# Patient Record
Sex: MALE | Race: ASIAN | NOT HISPANIC OR LATINO
[De-identification: names, ages, dates, MRNs, and addresses within clinical notes are randomized per-mention and may not be internally consistent; named-entity substitution may affect disease eponyms.]

---

## 2021-07-09 ENCOUNTER — APPOINTMENT (OUTPATIENT)
Dept: CT IMAGING | Facility: CLINIC | Age: 66
End: 2021-07-09
Payer: MEDICARE

## 2021-07-09 ENCOUNTER — OUTPATIENT (OUTPATIENT)
Dept: OUTPATIENT SERVICES | Facility: HOSPITAL | Age: 66
LOS: 1 days | End: 2021-07-09

## 2021-07-09 PROCEDURE — 75574 CT ANGIO HRT W/3D IMAGE: CPT | Mod: 26

## 2021-07-28 PROBLEM — Z00.00 ENCOUNTER FOR PREVENTIVE HEALTH EXAMINATION: Status: ACTIVE | Noted: 2021-07-28

## 2021-07-30 ENCOUNTER — APPOINTMENT (OUTPATIENT)
Dept: CT IMAGING | Facility: CLINIC | Age: 66
End: 2021-07-30
Payer: MEDICARE

## 2021-07-30 ENCOUNTER — OUTPATIENT (OUTPATIENT)
Dept: OUTPATIENT SERVICES | Facility: HOSPITAL | Age: 66
LOS: 1 days | End: 2021-07-30

## 2021-07-30 PROCEDURE — 71250 CT THORAX DX C-: CPT | Mod: 26

## 2021-08-20 ENCOUNTER — APPOINTMENT (OUTPATIENT)
Dept: THORACIC SURGERY | Facility: CLINIC | Age: 66
End: 2021-08-20
Payer: MEDICARE

## 2021-08-20 ENCOUNTER — OUTPATIENT (OUTPATIENT)
Dept: OUTPATIENT SERVICES | Facility: HOSPITAL | Age: 66
LOS: 1 days | End: 2021-08-20
Payer: COMMERCIAL

## 2021-08-20 VITALS
WEIGHT: 155 LBS | HEART RATE: 74 BPM | HEIGHT: 67 IN | SYSTOLIC BLOOD PRESSURE: 142 MMHG | TEMPERATURE: 98 F | DIASTOLIC BLOOD PRESSURE: 66 MMHG | BODY MASS INDEX: 24.33 KG/M2 | OXYGEN SATURATION: 98 %

## 2021-08-20 DIAGNOSIS — R91.8 OTHER NONSPECIFIC ABNORMAL FINDING OF LUNG FIELD: ICD-10-CM

## 2021-08-20 DIAGNOSIS — Z85.528 PERSONAL HISTORY OF OTHER MALIGNANT NEOPLASM OF KIDNEY: ICD-10-CM

## 2021-08-20 DIAGNOSIS — Z01.818 ENCOUNTER FOR OTHER PREPROCEDURAL EXAMINATION: ICD-10-CM

## 2021-08-20 LAB
ALBUMIN SERPL ELPH-MCNC: 4.4 G/DL — SIGNIFICANT CHANGE UP (ref 3.3–5)
ALP SERPL-CCNC: 107 U/L — SIGNIFICANT CHANGE UP (ref 40–120)
ALT FLD-CCNC: 20 U/L — SIGNIFICANT CHANGE UP (ref 10–45)
ANION GAP SERPL CALC-SCNC: 14 MMOL/L — SIGNIFICANT CHANGE UP (ref 5–17)
APPEARANCE UR: CLEAR — SIGNIFICANT CHANGE UP
APTT BLD: 44 SEC — HIGH (ref 27.5–35.5)
AST SERPL-CCNC: 20 U/L — SIGNIFICANT CHANGE UP (ref 10–40)
BASOPHILS # BLD AUTO: 0.03 K/UL — SIGNIFICANT CHANGE UP (ref 0–0.2)
BASOPHILS NFR BLD AUTO: 0.5 % — SIGNIFICANT CHANGE UP (ref 0–2)
BILIRUB SERPL-MCNC: 0.4 MG/DL — SIGNIFICANT CHANGE UP (ref 0.2–1.2)
BILIRUB UR-MCNC: NEGATIVE — SIGNIFICANT CHANGE UP
BUN SERPL-MCNC: 31 MG/DL — HIGH (ref 7–23)
CALCIUM SERPL-MCNC: 11.1 MG/DL — HIGH (ref 8.4–10.5)
CHLORIDE SERPL-SCNC: 100 MMOL/L — SIGNIFICANT CHANGE UP (ref 96–108)
CHOLEST SERPL-MCNC: 121 MG/DL — SIGNIFICANT CHANGE UP
CO2 SERPL-SCNC: 26 MMOL/L — SIGNIFICANT CHANGE UP (ref 22–31)
COLOR SPEC: YELLOW — SIGNIFICANT CHANGE UP
CREAT SERPL-MCNC: 1.46 MG/DL — HIGH (ref 0.5–1.3)
DIFF PNL FLD: NEGATIVE — SIGNIFICANT CHANGE UP
EOSINOPHIL # BLD AUTO: 0.2 K/UL — SIGNIFICANT CHANGE UP (ref 0–0.5)
EOSINOPHIL NFR BLD AUTO: 3.3 % — SIGNIFICANT CHANGE UP (ref 0–6)
GLUCOSE SERPL-MCNC: 149 MG/DL — HIGH (ref 70–99)
GLUCOSE UR QL: NEGATIVE — SIGNIFICANT CHANGE UP
HCT VFR BLD CALC: 38.9 % — LOW (ref 39–50)
HDLC SERPL-MCNC: 49 MG/DL — SIGNIFICANT CHANGE UP
HGB BLD-MCNC: 12.8 G/DL — LOW (ref 13–17)
IMM GRANULOCYTES NFR BLD AUTO: 0.2 % — SIGNIFICANT CHANGE UP (ref 0–1.5)
INR BLD: 0.87 — LOW (ref 0.88–1.16)
KETONES UR-MCNC: NEGATIVE — SIGNIFICANT CHANGE UP
LEUKOCYTE ESTERASE UR-ACNC: NEGATIVE — SIGNIFICANT CHANGE UP
LIPID PNL WITH DIRECT LDL SERPL: 57 MG/DL — SIGNIFICANT CHANGE UP
LYMPHOCYTES # BLD AUTO: 1.49 K/UL — SIGNIFICANT CHANGE UP (ref 1–3.3)
LYMPHOCYTES # BLD AUTO: 24.4 % — SIGNIFICANT CHANGE UP (ref 13–44)
MCHC RBC-ENTMCNC: 32.2 PG — SIGNIFICANT CHANGE UP (ref 27–34)
MCHC RBC-ENTMCNC: 32.9 GM/DL — SIGNIFICANT CHANGE UP (ref 32–36)
MCV RBC AUTO: 98 FL — SIGNIFICANT CHANGE UP (ref 80–100)
MONOCYTES # BLD AUTO: 0.37 K/UL — SIGNIFICANT CHANGE UP (ref 0–0.9)
MONOCYTES NFR BLD AUTO: 6.1 % — SIGNIFICANT CHANGE UP (ref 2–14)
NEUTROPHILS # BLD AUTO: 4 K/UL — SIGNIFICANT CHANGE UP (ref 1.8–7.4)
NEUTROPHILS NFR BLD AUTO: 65.5 % — SIGNIFICANT CHANGE UP (ref 43–77)
NITRITE UR-MCNC: NEGATIVE — SIGNIFICANT CHANGE UP
NON HDL CHOLESTEROL: 72 MG/DL — SIGNIFICANT CHANGE UP
NRBC # BLD: 0 /100 WBCS — SIGNIFICANT CHANGE UP (ref 0–0)
PH UR: 6.5 — SIGNIFICANT CHANGE UP (ref 5–8)
PLATELET # BLD AUTO: 264 K/UL — SIGNIFICANT CHANGE UP (ref 150–400)
POTASSIUM SERPL-MCNC: 5.4 MMOL/L — HIGH (ref 3.5–5.3)
POTASSIUM SERPL-SCNC: 5.4 MMOL/L — HIGH (ref 3.5–5.3)
PROT SERPL-MCNC: 8.4 G/DL — HIGH (ref 6–8.3)
PROT UR-MCNC: NEGATIVE MG/DL — SIGNIFICANT CHANGE UP
PROTHROM AB SERPL-ACNC: 10.5 SEC — LOW (ref 10.6–13.6)
RBC # BLD: 3.97 M/UL — LOW (ref 4.2–5.8)
RBC # FLD: 12.8 % — SIGNIFICANT CHANGE UP (ref 10.3–14.5)
SODIUM SERPL-SCNC: 140 MMOL/L — SIGNIFICANT CHANGE UP (ref 135–145)
SP GR SPEC: 1.01 — SIGNIFICANT CHANGE UP (ref 1–1.03)
TRIGL SERPL-MCNC: 76 MG/DL — SIGNIFICANT CHANGE UP
UROBILINOGEN FLD QL: 0.2 E.U./DL — SIGNIFICANT CHANGE UP
WBC # BLD: 6.1 K/UL — SIGNIFICANT CHANGE UP (ref 3.8–10.5)
WBC # FLD AUTO: 6.1 K/UL — SIGNIFICANT CHANGE UP (ref 3.8–10.5)

## 2021-08-20 PROCEDURE — 80061 LIPID PANEL: CPT

## 2021-08-20 PROCEDURE — 99205 OFFICE O/P NEW HI 60 MIN: CPT

## 2021-08-20 PROCEDURE — 36415 COLL VENOUS BLD VENIPUNCTURE: CPT

## 2021-08-20 PROCEDURE — 85025 COMPLETE CBC W/AUTO DIFF WBC: CPT

## 2021-08-20 PROCEDURE — 80053 COMPREHEN METABOLIC PANEL: CPT

## 2021-08-20 PROCEDURE — 85730 THROMBOPLASTIN TIME PARTIAL: CPT

## 2021-08-20 PROCEDURE — 81003 URINALYSIS AUTO W/O SCOPE: CPT

## 2021-08-20 PROCEDURE — 85610 PROTHROMBIN TIME: CPT

## 2021-08-20 RX ORDER — SITAGLIPTIN 100 MG/1
100 TABLET, FILM COATED ORAL
Refills: 0 | Status: ACTIVE | COMMUNITY

## 2021-08-20 RX ORDER — ASPIRIN 81 MG
81 TABLET, DELAYED RELEASE (ENTERIC COATED) ORAL
Refills: 0 | Status: ACTIVE | COMMUNITY

## 2021-08-20 RX ORDER — OMEPRAZOLE 40 MG/1
40 CAPSULE, DELAYED RELEASE ORAL
Refills: 0 | Status: ACTIVE | COMMUNITY

## 2021-08-20 RX ORDER — GLIPIZIDE 10 MG/1
10 TABLET ORAL
Refills: 0 | Status: ACTIVE | COMMUNITY

## 2021-08-20 RX ORDER — ATORVASTATIN CALCIUM 40 MG/1
40 TABLET, FILM COATED ORAL
Refills: 0 | Status: ACTIVE | COMMUNITY

## 2021-08-20 RX ORDER — TAMSULOSIN HYDROCHLORIDE 0.4 MG/1
0.4 CAPSULE ORAL
Refills: 0 | Status: ACTIVE | COMMUNITY

## 2021-08-20 RX ORDER — METFORMIN HYDROCHLORIDE 1000 MG/1
1000 TABLET, COATED ORAL
Refills: 0 | Status: ACTIVE | COMMUNITY

## 2021-08-20 RX ORDER — PRASUGREL HYDROCHLORIDE 10 MG/1
10 TABLET, COATED ORAL
Refills: 0 | Status: ACTIVE | COMMUNITY

## 2021-08-20 NOTE — PHYSICAL EXAM
[General Appearance - Alert] : alert [General Appearance - In No Acute Distress] : in no acute distress [General Appearance - Well Nourished] : well nourished [General Appearance - Well Developed] : well developed [Outer Ear] : the ears and nose were normal in appearance [Hearing Threshold Finger Rub Not [place]] : hearing was normal [Apical Impulse] : the apical impulse was normal [Heart Sounds] : normal S1 and S2 [Examination Of The Chest] : the chest was normal in appearance [Abnormal Walk] : normal gait [Musculoskeletal - Swelling] : no joint swelling seen

## 2021-08-23 ENCOUNTER — APPOINTMENT (OUTPATIENT)
Dept: MRI IMAGING | Facility: CLINIC | Age: 66
End: 2021-08-23
Payer: MEDICARE

## 2021-08-23 ENCOUNTER — OUTPATIENT (OUTPATIENT)
Dept: OUTPATIENT SERVICES | Facility: HOSPITAL | Age: 66
LOS: 1 days | End: 2021-08-23

## 2021-08-23 PROCEDURE — 70553 MRI BRAIN STEM W/O & W/DYE: CPT | Mod: 26

## 2021-08-31 ENCOUNTER — APPOINTMENT (OUTPATIENT)
Dept: DISASTER EMERGENCY | Facility: CLINIC | Age: 66
End: 2021-08-31

## 2021-09-01 LAB — SARS-COV-2 N GENE NPH QL NAA+PROBE: NOT DETECTED

## 2021-09-02 ENCOUNTER — TRANSCRIPTION ENCOUNTER (OUTPATIENT)
Age: 66
End: 2021-09-02

## 2021-09-02 VITALS
TEMPERATURE: 98 F | HEART RATE: 70 BPM | SYSTOLIC BLOOD PRESSURE: 134 MMHG | WEIGHT: 154.76 LBS | RESPIRATION RATE: 16 BRPM | OXYGEN SATURATION: 100 % | HEIGHT: 67 IN | DIASTOLIC BLOOD PRESSURE: 78 MMHG

## 2021-09-02 NOTE — HISTORY OF PRESENT ILLNESS
[FreeTextEntry1] : 66 year old male, PMHx of DM, HLD cardiac stents 2012 and 2018 on Effient, renal cell carcinoma s/p left nephrectomy in 2000, presented to cardiology for routine CTA Coronary, found to have multiple lung nodules. He is referred by Dr. Gina Ordaz for surgical evaluation. \par \par CTA Coronary with IV Contrast 7/9/21\par 1. Numerous solid pulmonary nodules/Mass In both lungs, multiple pleural/subpleural nodules, small left pleural effusion. Findings are suspicious for pulmonary metastases and possible malignant left pleural effusion. Further evaluation with dedicated chest CT is recommended.\par 2. Indeterminate right hilar node.\par \par CT Chest 7/30/21\par 1. Numerous pleural/subpleural pulmonary nodules, suspicious for metastases from primary pulmonary neoplasm or other extrapulmonary neoplasm. Correlation with PET/CT, histology is recommended.\par 2. Trace left pulmonary effusion, decreased in extent since prior study.\par \par PET 8/14/21\par - LLL peripheral masslike opacity with hypermetabolic activity. Small left pleural effusion wit nodular pleural thickening\par - Multiple pulmonary nodules/masses as detailed above with the dominant nodules demonstrate metabolic uptake as detailed above. Imaging findings suspicious for metastasis. \par - Left para-aortic hypermetabolic lymph node\par - No abdominopelvic hypermetabolic mass or adenopathy. \par - Hepatic steatosis. Calcified hepatic granulomas. \par - Right renal cyst. \par

## 2021-09-02 NOTE — H&P ADULT - NSHPPHYSICALEXAM_GEN_ALL_CORE
General: Patient lying comfortably in bed, no acute distress     Neurological: Alert and oriented. No focal neurological deficits     Cardiovascular: S1S2, RRR, no murmurs appreciated on exam     Respiratory: Clear to ausculation bilaterally, no wheeze/rhonchi/rales    Gastrointestinal: + BS, soft, non tender, non distended     Extremities: Warm and well perfused. No edema, no calf tenderness     Vascular: 2+ Peripheral pulses b/l

## 2021-09-02 NOTE — H&P ADULT - NSICDXPASTMEDICALHX_GEN_ALL_CORE_FT
PAST MEDICAL HISTORY:  DM (diabetes mellitus)     Pulmonary nodule     Stented coronary artery x 4 2018

## 2021-09-02 NOTE — CONSULT LETTER
[FreeTextEntry2] : Dr. Gina Ordaz (ref)\par Dr. Ramírez Ann [FreeTextEntry3] : Mich Gay MD\par Professor, Cardiovascular & Thoracic Surgery\par [employer]\par Director of the [facility] \par Director of Thoracic Surgery, [facility]\par \par [facility]\par [address]\par [facility] 4th Floor\par [place] [address]\par Phone: [phone]\par Fax: [phone]

## 2021-09-02 NOTE — H&P ADULT - NSHPPOAPULMEMBOLUS_GEN_A_CORE
Patient given instructions per PA.  She wrote it down and verbalized back to nurse.  Confirmed redraw on Thursday  
Please contact - lets increase to 8mg today (4 tabs), then 3 and 1/2 tabs on Wednesday.  Repeat draw on Thursday.  Thanks  
PA      
Pt was at [facility] ER yesterday and INR was 1.4.  
no

## 2021-09-02 NOTE — END OF VISIT
[Time Spent: ___ minutes] : I have spent [unfilled] minutes of time on the encounter. [FreeTextEntry3] : I, RAUDEL ADDISON , am scribing for and in the presence of RAUDEL ADDISON the following sections: history of present illness, past medical/family/surgical/family/social history, review of systems, vital signs, physical exam, and disposition.\par  \par I personally performed the services described in the documentation, reviewed the documentation recorded by the scribe in my presence and it accurately and completely records my words and actions.

## 2021-09-02 NOTE — H&P ADULT - HISTORY OF PRESENT ILLNESS
66 year old male, PMHx of DM, HLD cardiac stents 2012 and 2018 on Effient, renal cell carcinoma s/p left nephrectomy in 2000, presented to cardiology for routine CTA Coronary, found to have multiple lung nodules. He is referred by Dr. Gina Ordaz for surgical evaluation.     CTA Coronary with IV Contrast 7/9/21  1. Numerous solid pulmonary nodules/Mass In both lungs, multiple pleural/subpleural nodules, small left pleural effusion. Findings are suspicious for pulmonary metastases and possible malignant left pleural effusion. Further evaluation with dedicated chest CT is recommended.  2. Indeterminate right hilar node.    CT Chest 7/30/21  1. Numerous pleural/subpleural pulmonary nodules, suspicious for metastases from primary pulmonary neoplasm or other extrapulmonary neoplasm. Correlation with PET/CT, histology is recommended.  2. Trace left pulmonary effusion, decreased in extent since prior study.    PET 8/14/21  - LLL peripheral masslike opacity with hypermetabolic activity. Small left pleural effusion wit nodular pleural thickening  - Multiple pulmonary nodules/masses as detailed above with the dominant nodules demonstrate metabolic uptake as detailed above. Imaging findings suspicious for metastasis.   - Left para-aortic hypermetabolic lymph node  - No abdominopelvic hypermetabolic mass or adenopathy.   - Hepatic steatosis. Calcified hepatic granulomas.   - Right renal cyst.     MRI Brain 7/30/21: negative for metastatic disease    CT Chest 7/30/21 and PET 8/14/21 reviewed with patient - multiple nodules suspicious malignancy. Given hx of  renal cell carcinoma, may be metastatic disease. Will arrange for LVATS, robotic assisted, pleural biopsy. pleurodesis for tissue biopsy. The surgical approach, risks, benefits, and alternatives were explained to the patient, who understood and agreed to the above.  66 year old male, PMHx of DM, HLD cardiac stents 2012 and 2018 on Effient, renal cell carcinoma s/p left nephrectomy in 2000, presented to cardiology for routine CTA Coronary, found to have multiple lung nodules. He is referred by Dr. Gina Ordaz for surgical evaluation.     CTA Coronary with IV Contrast 7/9/21  1. Numerous solid pulmonary nodules/Mass In both lungs, multiple pleural/subpleural nodules, small left pleural effusion. Findings are suspicious for pulmonary metastases and possible malignant left pleural effusion. Further evaluation with dedicated chest CT is recommended.  2. Indeterminate right hilar node.    CT Chest 7/30/21  1. Numerous pleural/subpleural pulmonary nodules, suspicious for metastases from primary pulmonary neoplasm or other extrapulmonary neoplasm. Correlation with PET/CT, histology is recommended.  2. Trace left pulmonary effusion, decreased in extent since prior study.    PET 8/14/21  - LLL peripheral masslike opacity with hypermetabolic activity. Small left pleural effusion wit nodular pleural thickening  - Multiple pulmonary nodules/masses as detailed above with the dominant nodules demonstrate metabolic uptake as detailed above. Imaging findings suspicious for metastasis.   - Left para-aortic hypermetabolic lymph node  - No abdominopelvic hypermetabolic mass or adenopathy.   - Hepatic steatosis. Calcified hepatic granulomas.   - Right renal cyst.     MRI Brain 7/30/21: negative for metastatic disease    CT Chest 7/30/21 and PET 8/14/21 reviewed with patient - multiple nodules suspicious malignancy. Given hx of  renal cell carcinoma, may be metastatic disease. Presentes today for LVATS, robotic assisted, pleural biopsy. pleurodesis for tissue biopsy. The surgical approach, risks, benefits, and alternatives were explained to the patient, who understood and agreed to the above.     Patient seen in same day holding area; Reports no changes to PMHx or medications since last seen by our team. Denies acute or current SOB, chest pain, palpitation, N/V/D, fever/chills, recent illness, or any other concerning symptoms.

## 2021-09-02 NOTE — H&P ADULT - ASSESSMENT
Patient cleared by PCP and cardiologist.     PLAN: LVATS, robotic assisted, pleural biopsy, pleurodesis.  Patient cleared by PCP and cardiologist.     PLAN: LVATS, robotic assisted, pleural biopsy, pleurodesis.     66 year old male, PMHx of DM, HLD cardiac stents 2012 and 2018 on Effient, renal cell carcinoma s/p left nephrectomy in 2000, presented to cardiology for routine CTA Coronary, found to have multiple lung nodules. He is referred by Dr. Gina Ordaz for surgical evaluation.     CTA Coronary with IV Contrast 7/9/21  1. Numerous solid pulmonary nodules/Mass In both lungs, multiple pleural/subpleural nodules, small left pleural effusion. Findings are suspicious for pulmonary metastases and possible malignant left pleural effusion. Further evaluation with dedicated chest CT is recommended.  2. Indeterminate right hilar node.    CT Chest 7/30/21  1. Numerous pleural/subpleural pulmonary nodules, suspicious for metastases from primary pulmonary neoplasm or other extrapulmonary neoplasm. Correlation with PET/CT, histology is recommended.  2. Trace left pulmonary effusion, decreased in extent since prior study.    PET 8/14/21  - LLL peripheral masslike opacity with hypermetabolic activity. Small left pleural effusion wit nodular pleural thickening  - Multiple pulmonary nodules/masses as detailed above with the dominant nodules demonstrate metabolic uptake as detailed above. Imaging findings suspicious for metastasis.   - Left para-aortic hypermetabolic lymph node  - No abdominopelvic hypermetabolic mass or adenopathy.   - Hepatic steatosis. Calcified hepatic granulomas.   - Right renal cyst.     MRI Brain 7/30/21: negative for metastatic disease    CT Chest 7/30/21 and PET 8/14/21 reviewed with patient - multiple nodules suspicious malignancy. Given hx of  renal cell carcinoma, may be metastatic disease. Presentes today for LVATS, robotic assisted, pleural biopsy. pleurodesis for tissue biopsy. The surgical approach, risks, benefits, and alternatives were explained to the patient, who understood and agreed to the above.     Admit under Dr. Gay  via same day surgery. Consent signed, placed on chart.  Risks/benefits reviewed, patient understands and agrees. T&S ordered and blood products placed on hold for OR.  To 9  post-op.

## 2021-09-02 NOTE — ASSESSMENT
[FreeTextEntry1] : 66 year old male, PMHx of DM, HLD cardiac stents 2012 and 2018 on Effient, renal cell carcinoma s/p left nephrectomy in 2000, presented to cardiology for routine CTA Coronary, found to have multiple lung nodules. He is referred by Dr. Gina Ordaz for surgical evaluation. \par \par CTA Coronary with IV Contrast 7/9/21\par 1. Numerous solid pulmonary nodules/Mass In both lungs, multiple pleural/subpleural nodules, small left pleural effusion. Findings are suspicious for pulmonary metastases and possible malignant left pleural effusion. Further evaluation with dedicated chest CT is recommended.\par 2. Indeterminate right hilar node.\par \par CT Chest 7/30/21\par 1. Numerous pleural/subpleural pulmonary nodules, suspicious for metastases from primary pulmonary neoplasm or other extrapulmonary neoplasm. Correlation with PET/CT, histology is recommended.\par 2. Trace left pulmonary effusion, decreased in extent since prior study.\par \par PET 8/14/21\par - LLL peripheral masslike opacity with hypermetabolic activity. Small left pleural effusion wit nodular pleural thickening\par - Multiple pulmonary nodules/masses as detailed above with the dominant nodules demonstrate metabolic uptake as detailed above. Imaging findings suspicious for metastasis. \par - Left para-aortic hypermetabolic lymph node\par - No abdominopelvic hypermetabolic mass or adenopathy. \par - Hepatic steatosis. Calcified hepatic granulomas. \par - Right renal cyst. \par \par CT Chest 7/30/21 and PET 8/14/21 reviewed with patient - multiple nodules suspicious malignancy. Given hx of  renal cell carcinoma, may be metastatic disease. Will arrange for LVATS, robotic assisted, pleural biopsy. pleurodesis for tissue biopsy. The surgical approach, risks, benefits, and alternatives were explained to the patient, who understood and agreed to the above. \par \par I have reviewed the patient's medical records and diagnostic images at the time of this office consultation and have made the following recommendation.\par Plan:\par 1. Request records from [facility]\par 2. Medical clearance\par 3. Cardiac clearance, must hold Effient x 7days\par 4. LVATS, robotic assisted, pleural biopsy, pleurodesis. \par 5. Brain MRI

## 2021-09-03 ENCOUNTER — APPOINTMENT (OUTPATIENT)
Dept: THORACIC SURGERY | Facility: HOSPITAL | Age: 66
End: 2021-09-03

## 2021-09-03 ENCOUNTER — INPATIENT (INPATIENT)
Facility: HOSPITAL | Age: 66
LOS: 0 days | Discharge: HOME | DRG: 168 | End: 2021-09-04
Attending: THORACIC SURGERY (CARDIOTHORACIC VASCULAR SURGERY) | Admitting: THORACIC SURGERY (CARDIOTHORACIC VASCULAR SURGERY)
Payer: MEDICARE

## 2021-09-03 ENCOUNTER — RESULT REVIEW (OUTPATIENT)
Age: 66
End: 2021-09-03

## 2021-09-03 DIAGNOSIS — Z98.890 OTHER SPECIFIED POSTPROCEDURAL STATES: Chronic | ICD-10-CM

## 2021-09-03 LAB
ANION GAP SERPL CALC-SCNC: 9 MMOL/L — SIGNIFICANT CHANGE UP (ref 5–17)
BASOPHILS # BLD AUTO: 0.02 K/UL — SIGNIFICANT CHANGE UP (ref 0–0.2)
BASOPHILS NFR BLD AUTO: 0.3 % — SIGNIFICANT CHANGE UP (ref 0–2)
BLD GP AB SCN SERPL QL: NEGATIVE — SIGNIFICANT CHANGE UP
BUN SERPL-MCNC: 28 MG/DL — HIGH (ref 7–23)
CALCIUM SERPL-MCNC: 9.2 MG/DL — SIGNIFICANT CHANGE UP (ref 8.4–10.5)
CHLORIDE SERPL-SCNC: 106 MMOL/L — SIGNIFICANT CHANGE UP (ref 96–108)
CO2 SERPL-SCNC: 26 MMOL/L — SIGNIFICANT CHANGE UP (ref 22–31)
CREAT SERPL-MCNC: 1.27 MG/DL — SIGNIFICANT CHANGE UP (ref 0.5–1.3)
EOSINOPHIL # BLD AUTO: 0.2 K/UL — SIGNIFICANT CHANGE UP (ref 0–0.5)
EOSINOPHIL NFR BLD AUTO: 3.4 % — SIGNIFICANT CHANGE UP (ref 0–6)
GLUCOSE BLDC GLUCOMTR-MCNC: 152 MG/DL — HIGH (ref 70–99)
GLUCOSE BLDC GLUCOMTR-MCNC: 163 MG/DL — HIGH (ref 70–99)
GLUCOSE BLDC GLUCOMTR-MCNC: 204 MG/DL — HIGH (ref 70–99)
GLUCOSE BLDC GLUCOMTR-MCNC: 235 MG/DL — HIGH (ref 70–99)
GLUCOSE SERPL-MCNC: 223 MG/DL — HIGH (ref 70–99)
HCT VFR BLD CALC: 30.5 % — LOW (ref 39–50)
HGB BLD-MCNC: 10.1 G/DL — LOW (ref 13–17)
IMM GRANULOCYTES NFR BLD AUTO: 0.2 % — SIGNIFICANT CHANGE UP (ref 0–1.5)
LYMPHOCYTES # BLD AUTO: 1.28 K/UL — SIGNIFICANT CHANGE UP (ref 1–3.3)
LYMPHOCYTES # BLD AUTO: 22 % — SIGNIFICANT CHANGE UP (ref 13–44)
MCHC RBC-ENTMCNC: 32.4 PG — SIGNIFICANT CHANGE UP (ref 27–34)
MCHC RBC-ENTMCNC: 33.1 GM/DL — SIGNIFICANT CHANGE UP (ref 32–36)
MCV RBC AUTO: 97.8 FL — SIGNIFICANT CHANGE UP (ref 80–100)
MONOCYTES # BLD AUTO: 0.42 K/UL — SIGNIFICANT CHANGE UP (ref 0–0.9)
MONOCYTES NFR BLD AUTO: 7.2 % — SIGNIFICANT CHANGE UP (ref 2–14)
NEUTROPHILS # BLD AUTO: 3.88 K/UL — SIGNIFICANT CHANGE UP (ref 1.8–7.4)
NEUTROPHILS NFR BLD AUTO: 66.9 % — SIGNIFICANT CHANGE UP (ref 43–77)
NRBC # BLD: 0 /100 WBCS — SIGNIFICANT CHANGE UP (ref 0–0)
PLATELET # BLD AUTO: 194 K/UL — SIGNIFICANT CHANGE UP (ref 150–400)
POTASSIUM SERPL-MCNC: 5.6 MMOL/L — HIGH (ref 3.5–5.3)
POTASSIUM SERPL-SCNC: 5.6 MMOL/L — HIGH (ref 3.5–5.3)
RBC # BLD: 3.12 M/UL — LOW (ref 4.2–5.8)
RBC # FLD: 12.5 % — SIGNIFICANT CHANGE UP (ref 10.3–14.5)
RH IG SCN BLD-IMP: POSITIVE — SIGNIFICANT CHANGE UP
SODIUM SERPL-SCNC: 141 MMOL/L — SIGNIFICANT CHANGE UP (ref 135–145)
WBC # BLD: 5.81 K/UL — SIGNIFICANT CHANGE UP (ref 3.8–10.5)
WBC # FLD AUTO: 5.81 K/UL — SIGNIFICANT CHANGE UP (ref 3.8–10.5)

## 2021-09-03 PROCEDURE — S2900 ROBOTIC SURGICAL SYSTEM: CPT | Mod: NC

## 2021-09-03 PROCEDURE — 88342 IMHCHEM/IMCYTCHM 1ST ANTB: CPT | Mod: 26

## 2021-09-03 PROCEDURE — 32666 THORACOSCOPY W/WEDGE RESECT: CPT | Mod: AS

## 2021-09-03 PROCEDURE — 71045 X-RAY EXAM CHEST 1 VIEW: CPT | Mod: 26

## 2021-09-03 PROCEDURE — 32666 THORACOSCOPY W/WEDGE RESECT: CPT

## 2021-09-03 PROCEDURE — 88307 TISSUE EXAM BY PATHOLOGIST: CPT | Mod: 26

## 2021-09-03 PROCEDURE — 88341 IMHCHEM/IMCYTCHM EA ADD ANTB: CPT | Mod: 26

## 2021-09-03 RX ORDER — ATORVASTATIN CALCIUM 80 MG/1
1 TABLET, FILM COATED ORAL
Qty: 0 | Refills: 0 | DISCHARGE

## 2021-09-03 RX ORDER — TAMSULOSIN HYDROCHLORIDE 0.4 MG/1
0.4 CAPSULE ORAL AT BEDTIME
Refills: 0 | Status: DISCONTINUED | OUTPATIENT
Start: 2021-09-03 | End: 2021-09-04

## 2021-09-03 RX ORDER — INFLUENZA VIRUS VACCINE 15; 15; 15; 15 UG/.5ML; UG/.5ML; UG/.5ML; UG/.5ML
0.5 SUSPENSION INTRAMUSCULAR ONCE
Refills: 0 | Status: DISCONTINUED | OUTPATIENT
Start: 2021-09-03 | End: 2021-09-03

## 2021-09-03 RX ORDER — PANTOPRAZOLE SODIUM 20 MG/1
40 TABLET, DELAYED RELEASE ORAL DAILY
Refills: 0 | Status: DISCONTINUED | OUTPATIENT
Start: 2021-09-03 | End: 2021-09-04

## 2021-09-03 RX ORDER — HEPARIN SODIUM 5000 [USP'U]/ML
5000 INJECTION INTRAVENOUS; SUBCUTANEOUS EVERY 8 HOURS
Refills: 0 | Status: DISCONTINUED | OUTPATIENT
Start: 2021-09-03 | End: 2021-09-04

## 2021-09-03 RX ORDER — SODIUM CHLORIDE 9 MG/ML
1000 INJECTION, SOLUTION INTRAVENOUS
Refills: 0 | Status: DISCONTINUED | OUTPATIENT
Start: 2021-09-03 | End: 2021-09-04

## 2021-09-03 RX ORDER — PRASUGREL 5 MG/1
1 TABLET, FILM COATED ORAL
Qty: 0 | Refills: 0 | DISCHARGE

## 2021-09-03 RX ORDER — ATORVASTATIN CALCIUM 80 MG/1
40 TABLET, FILM COATED ORAL AT BEDTIME
Refills: 0 | Status: DISCONTINUED | OUTPATIENT
Start: 2021-09-03 | End: 2021-09-04

## 2021-09-03 RX ORDER — DEXTROSE 50 % IN WATER 50 %
15 SYRINGE (ML) INTRAVENOUS ONCE
Refills: 0 | Status: DISCONTINUED | OUTPATIENT
Start: 2021-09-03 | End: 2021-09-04

## 2021-09-03 RX ORDER — TALC 4 G/50ML
1 POWDER INTRAPLEURAL ONCE
Refills: 0 | Status: DISCONTINUED | OUTPATIENT
Start: 2021-09-03 | End: 2021-09-03

## 2021-09-03 RX ORDER — DEXTROSE 50 % IN WATER 50 %
25 SYRINGE (ML) INTRAVENOUS ONCE
Refills: 0 | Status: DISCONTINUED | OUTPATIENT
Start: 2021-09-03 | End: 2021-09-04

## 2021-09-03 RX ORDER — INSULIN LISPRO 100/ML
VIAL (ML) SUBCUTANEOUS
Refills: 0 | Status: DISCONTINUED | OUTPATIENT
Start: 2021-09-03 | End: 2021-09-04

## 2021-09-03 RX ORDER — INFLUENZA VIRUS VACCINE 15; 15; 15; 15 UG/.5ML; UG/.5ML; UG/.5ML; UG/.5ML
0.7 SUSPENSION INTRAMUSCULAR ONCE
Refills: 0 | Status: DISCONTINUED | OUTPATIENT
Start: 2021-09-03 | End: 2021-09-04

## 2021-09-03 RX ORDER — GLUCAGON INJECTION, SOLUTION 0.5 MG/.1ML
1 INJECTION, SOLUTION SUBCUTANEOUS ONCE
Refills: 0 | Status: DISCONTINUED | OUTPATIENT
Start: 2021-09-03 | End: 2021-09-04

## 2021-09-03 RX ORDER — ASPIRIN/CALCIUM CARB/MAGNESIUM 324 MG
0 TABLET ORAL
Qty: 0 | Refills: 0 | DISCHARGE

## 2021-09-03 RX ORDER — METFORMIN HYDROCHLORIDE 850 MG/1
1 TABLET ORAL
Qty: 0 | Refills: 0 | DISCHARGE

## 2021-09-03 RX ORDER — SITAGLIPTIN 50 MG/1
1 TABLET, FILM COATED ORAL
Qty: 0 | Refills: 0 | DISCHARGE

## 2021-09-03 RX ORDER — DEXTROSE 50 % IN WATER 50 %
12.5 SYRINGE (ML) INTRAVENOUS ONCE
Refills: 0 | Status: DISCONTINUED | OUTPATIENT
Start: 2021-09-03 | End: 2021-09-04

## 2021-09-03 RX ORDER — BUPIVACAINE 13.3 MG/ML
20 INJECTION, SUSPENSION, LIPOSOMAL INFILTRATION ONCE
Refills: 0 | Status: DISCONTINUED | OUTPATIENT
Start: 2021-09-03 | End: 2021-09-04

## 2021-09-03 RX ORDER — HYDRALAZINE HCL 50 MG
10 TABLET ORAL ONCE
Refills: 0 | Status: COMPLETED | OUTPATIENT
Start: 2021-09-03 | End: 2021-09-03

## 2021-09-03 RX ORDER — TAMSULOSIN HYDROCHLORIDE 0.4 MG/1
1 CAPSULE ORAL
Qty: 0 | Refills: 0 | DISCHARGE

## 2021-09-03 RX ORDER — HYDROMORPHONE HYDROCHLORIDE 2 MG/ML
0.25 INJECTION INTRAMUSCULAR; INTRAVENOUS; SUBCUTANEOUS ONCE
Refills: 0 | Status: DISCONTINUED | OUTPATIENT
Start: 2021-09-03 | End: 2021-09-03

## 2021-09-03 RX ADMIN — PANTOPRAZOLE SODIUM 40 MILLIGRAM(S): 20 TABLET, DELAYED RELEASE ORAL at 13:22

## 2021-09-03 RX ADMIN — TAMSULOSIN HYDROCHLORIDE 0.4 MILLIGRAM(S): 0.4 CAPSULE ORAL at 21:29

## 2021-09-03 RX ADMIN — HYDROMORPHONE HYDROCHLORIDE 0.25 MILLIGRAM(S): 2 INJECTION INTRAMUSCULAR; INTRAVENOUS; SUBCUTANEOUS at 16:31

## 2021-09-03 RX ADMIN — ATORVASTATIN CALCIUM 40 MILLIGRAM(S): 80 TABLET, FILM COATED ORAL at 21:29

## 2021-09-03 RX ADMIN — Medication 2: at 21:27

## 2021-09-03 RX ADMIN — Medication 10 MILLIGRAM(S): at 13:22

## 2021-09-03 RX ADMIN — HEPARIN SODIUM 5000 UNIT(S): 5000 INJECTION INTRAVENOUS; SUBCUTANEOUS at 21:29

## 2021-09-03 RX ADMIN — HEPARIN SODIUM 5000 UNIT(S): 5000 INJECTION INTRAVENOUS; SUBCUTANEOUS at 15:19

## 2021-09-03 RX ADMIN — Medication 2: at 16:33

## 2021-09-03 NOTE — BRIEF OPERATIVE NOTE - NSICDXBRIEFPREOP_GEN_ALL_CORE_FT
PRE-OP DIAGNOSIS:  Lung cancer 03-Sep-2021 10:50:38  Gabbie Dupont V   PRE-OP DIAGNOSIS:  Lung mass 03-Sep-2021 11:47:59  Gabbie Dupont

## 2021-09-03 NOTE — BRIEF OPERATIVE NOTE - NSICDXBRIEFPOSTOP_GEN_ALL_CORE_FT
POST-OP DIAGNOSIS:  Lung cancer 03-Sep-2021 10:50:50  Gabbie Dupont   POST-OP DIAGNOSIS:  Lung mass 03-Sep-2021 11:48:11  Gabbie Dupont

## 2021-09-04 ENCOUNTER — TRANSCRIPTION ENCOUNTER (OUTPATIENT)
Age: 66
End: 2021-09-04

## 2021-09-04 VITALS — TEMPERATURE: 98 F

## 2021-09-04 LAB
A1C WITH ESTIMATED AVERAGE GLUCOSE RESULT: 7.9 % — HIGH (ref 4–5.6)
ALBUMIN SERPL ELPH-MCNC: 3.7 G/DL — SIGNIFICANT CHANGE UP (ref 3.3–5)
ALP SERPL-CCNC: 80 U/L — SIGNIFICANT CHANGE UP (ref 40–120)
ALT FLD-CCNC: 17 U/L — SIGNIFICANT CHANGE UP (ref 10–45)
ANION GAP SERPL CALC-SCNC: 11 MMOL/L — SIGNIFICANT CHANGE UP (ref 5–17)
AST SERPL-CCNC: 19 U/L — SIGNIFICANT CHANGE UP (ref 10–40)
BILIRUB SERPL-MCNC: 0.6 MG/DL — SIGNIFICANT CHANGE UP (ref 0.2–1.2)
BUN SERPL-MCNC: 21 MG/DL — SIGNIFICANT CHANGE UP (ref 7–23)
CALCIUM SERPL-MCNC: 9.6 MG/DL — SIGNIFICANT CHANGE UP (ref 8.4–10.5)
CHLORIDE SERPL-SCNC: 102 MMOL/L — SIGNIFICANT CHANGE UP (ref 96–108)
CO2 SERPL-SCNC: 25 MMOL/L — SIGNIFICANT CHANGE UP (ref 22–31)
CREAT SERPL-MCNC: 1.34 MG/DL — HIGH (ref 0.5–1.3)
ESTIMATED AVERAGE GLUCOSE: 180 MG/DL — HIGH (ref 68–114)
GLUCOSE BLDC GLUCOMTR-MCNC: 151 MG/DL — HIGH (ref 70–99)
GLUCOSE BLDC GLUCOMTR-MCNC: 289 MG/DL — HIGH (ref 70–99)
GLUCOSE SERPL-MCNC: 153 MG/DL — HIGH (ref 70–99)
HCT VFR BLD CALC: 34.9 % — LOW (ref 39–50)
HCV AB S/CO SERPL IA: 0.04 S/CO — SIGNIFICANT CHANGE UP
HCV AB SERPL-IMP: SIGNIFICANT CHANGE UP
HGB BLD-MCNC: 11.5 G/DL — LOW (ref 13–17)
MAGNESIUM SERPL-MCNC: 1.6 MG/DL — SIGNIFICANT CHANGE UP (ref 1.6–2.6)
MCHC RBC-ENTMCNC: 31.8 PG — SIGNIFICANT CHANGE UP (ref 27–34)
MCHC RBC-ENTMCNC: 33 GM/DL — SIGNIFICANT CHANGE UP (ref 32–36)
MCV RBC AUTO: 96.4 FL — SIGNIFICANT CHANGE UP (ref 80–100)
NRBC # BLD: 0 /100 WBCS — SIGNIFICANT CHANGE UP (ref 0–0)
PLATELET # BLD AUTO: 218 K/UL — SIGNIFICANT CHANGE UP (ref 150–400)
POTASSIUM SERPL-MCNC: 4.4 MMOL/L — SIGNIFICANT CHANGE UP (ref 3.5–5.3)
POTASSIUM SERPL-SCNC: 4.4 MMOL/L — SIGNIFICANT CHANGE UP (ref 3.5–5.3)
PROT SERPL-MCNC: 7.4 G/DL — SIGNIFICANT CHANGE UP (ref 6–8.3)
RBC # BLD: 3.62 M/UL — LOW (ref 4.2–5.8)
RBC # FLD: 12.6 % — SIGNIFICANT CHANGE UP (ref 10.3–14.5)
SODIUM SERPL-SCNC: 138 MMOL/L — SIGNIFICANT CHANGE UP (ref 135–145)
WBC # BLD: 7.96 K/UL — SIGNIFICANT CHANGE UP (ref 3.8–10.5)
WBC # FLD AUTO: 7.96 K/UL — SIGNIFICANT CHANGE UP (ref 3.8–10.5)

## 2021-09-04 PROCEDURE — 71045 X-RAY EXAM CHEST 1 VIEW: CPT | Mod: 26,76

## 2021-09-04 RX ORDER — ACETAMINOPHEN 500 MG
1000 TABLET ORAL ONCE
Refills: 0 | Status: COMPLETED | OUTPATIENT
Start: 2021-09-04 | End: 2021-09-04

## 2021-09-04 RX ORDER — MAGNESIUM OXIDE 400 MG ORAL TABLET 241.3 MG
800 TABLET ORAL ONCE
Refills: 0 | Status: COMPLETED | OUTPATIENT
Start: 2021-09-04 | End: 2021-09-04

## 2021-09-04 RX ADMIN — Medication 1000 MILLIGRAM(S): at 09:40

## 2021-09-04 RX ADMIN — HEPARIN SODIUM 5000 UNIT(S): 5000 INJECTION INTRAVENOUS; SUBCUTANEOUS at 05:39

## 2021-09-04 RX ADMIN — MAGNESIUM OXIDE 400 MG ORAL TABLET 800 MILLIGRAM(S): 241.3 TABLET ORAL at 10:24

## 2021-09-04 RX ADMIN — Medication 6: at 12:15

## 2021-09-04 RX ADMIN — Medication 2: at 06:59

## 2021-09-04 RX ADMIN — PANTOPRAZOLE SODIUM 40 MILLIGRAM(S): 20 TABLET, DELAYED RELEASE ORAL at 11:58

## 2021-09-04 RX ADMIN — Medication 400 MILLIGRAM(S): at 09:25

## 2021-09-04 NOTE — DISCHARGE NOTE PROVIDER - NSDCFUADDINST_GEN_ALL_CORE_FT
-Walk daily as tolerated and use your incentive spirometer 10 times every hour while you are awake.     -Please weigh yourself daily. If you notice over a 3 pound weight gain in 3 days, this is a sign you are likely retaining too much fluid. It is imperative you call our right away with unexplained rapid weight gain.      -Please continue to wear the compression stockings given to you in the hospital at home. This is a way to prevent fluid from building up in your legs.     -No driving or strenuous activity/exercise until cleared by your surgeon.    -Gently clean your incisions with unscented/antibacterial soap and water, pat dry.  You may leave them open to air.    -Call your doctor if you have shortness of breath, chest pain not relieved by pain medication, dizziness, fever >101.5, or increased redness or drainage from incisions.   -You have a stitch with a dressing in place on your left chest. Please removed this dressing after 2 days.     -Walk daily as tolerated and use your incentive spirometer 10 times every hour while you are awake.     -Please weigh yourself daily. If you notice over a 3 pound weight gain in 3 days, this is a sign you are likely retaining too much fluid. It is imperative you call our right away with unexplained rapid weight gain.      -Please continue to wear the compression stockings given to you in the hospital at home. This is a way to prevent fluid from building up in your legs.     -No driving or strenuous activity/exercise until cleared by your surgeon.    -Gently clean your incisions with unscented/antibacterial soap and water, pat dry.  You may leave them open to air.    -Call your doctor if you have shortness of breath, chest pain not relieved by pain medication, dizziness, fever >101.5, or increased redness or drainage from incisions.

## 2021-09-04 NOTE — DISCHARGE NOTE PROVIDER - NSDCCPTREATMENT_GEN_ALL_CORE_FT
PRINCIPAL PROCEDURE  Procedure: Resection, lung, wedge, thoracoscopic  Findings and Treatment: LVTAS, robotic assisted, left lower lobe wedge resection.

## 2021-09-04 NOTE — PROGRESS NOTE ADULT - SUBJECTIVE AND OBJECTIVE BOX
Patient discussed on morning rounds with Dr. Gay     Operation / Date: 9/3/21 LVATS, homao assisted, Left Lower Lobe Wedge Resection      SUBJECTIVE ASSESSMENT:  66y Male recovering in PACU from procedure above. Patient endorsing 5/10 pain, primarily with deep inhalation. Patient denies chest pain, sob, palpitations, n/v/d/c. Patient is hypertensive in the PACU.       Vital Signs Last 24 Hrs  T(C): 36.6 (03 Sep 2021 12:01), Max: 36.6 (03 Sep 2021 12:01)  T(F): 97.8 (03 Sep 2021 12:01), Max: 97.8 (03 Sep 2021 12:01)  HR: 65 (03 Sep 2021 13:20) (60 - 79)  BP: 166/87 (03 Sep 2021 13:20) (144/78 - 182/79)  BP(mean): 120 (03 Sep 2021 13:20) (105 - 120)  RR: 18 (03 Sep 2021 13:20) (14 - 18)  SpO2: 100% (03 Sep 2021 13:20) (100% - 100%)  I&O's Detail    03 Sep 2021 07:01  -  03 Sep 2021 14:03  --------------------------------------------------------  IN:    Lactated Ringers: 120 mL  Total IN: 120 mL    OUT:    Blood Loss (mL): 20 mL  Total OUT: 20 mL    Total NET: 100 mL      CHEST TUBE:  Yes AIR LEAKS: No. Suction   PANTERA DRAIN:   No.  EPICARDIAL WIRES:  No.  TIE DOWNS: Yes .  DRIVER:  No.    PHYSICAL EXAM:    GEN: NAD, looks comfortable  Psych: Mood appropriate  Neuro: A&Ox3.  No focal deficits.  Moving all extremities.   HEENT: No obvious abnormalities  CV: S1S2, regular, no murmurs appreciated.  No carotid bruits.  No JVD  Lungs: Clear B/L.  No wheezing, rales or rhonchi  ABD: Soft, non-tender, non-distended.  +Bowel sounds  EXT: Warm and well perfused.  No peripheral edema noted  Musculoskeletal: Moving all extremities with normal ROM, no joint swelling  PV: Pedal pulses palpable  Incision Sites: left chest tube to suction, dressing c/d/i; VATS incisions closed with dermabond, c/d/i    LABS:                        10.1   5.81  )-----------( 194      ( 03 Sep 2021 12:19 )             30.5       COUMADIN: No.          09-03    141  |  106  |  28<H>  ----------------------------<  223<H>  5.6<H>   |  26  |  1.27    Ca    9.2      03 Sep 2021 12:19      MEDICATIONS  (STANDING):  atorvastatin 40 milliGRAM(s) Oral at bedtime  BUpivacaine liposome 1.3% Injectable (no eMAR) 20 milliLiter(s) Local Injection once  dextrose 40% Gel 15 Gram(s) Oral once  dextrose 5%. 1000 milliLiter(s) (50 mL/Hr) IV Continuous <Continuous>  dextrose 5%. 1000 milliLiter(s) (100 mL/Hr) IV Continuous <Continuous>  dextrose 50% Injectable 25 Gram(s) IV Push once  dextrose 50% Injectable 12.5 Gram(s) IV Push once  dextrose 50% Injectable 25 Gram(s) IV Push once  glucagon  Injectable 1 milliGRAM(s) IntraMuscular once  heparin   Injectable 5000 Unit(s) SubCutaneous every 8 hours  influenza  Vaccine (HIGH DOSE) 0.7 milliLiter(s) IntraMuscular once  insulin lispro (ADMELOG) corrective regimen sliding scale   SubCutaneous Before meals and at bedtime  lactated ringers. 1000 milliLiter(s) (60 mL/Hr) IV Continuous <Continuous>  pantoprazole  Injectable 40 milliGRAM(s) IV Push daily  tamsulosin 0.4 milliGRAM(s) Oral at bedtime    MEDICATIONS  (PRN):  HYDROmorphone  Injectable 0.25 milliGRAM(s) IV Push once PRN Pain        RADIOLOGY & ADDITIONAL TESTS:  post procedure CXR stable s/p wedge resection, CT in place, no ptx  
Patient discussed on morning rounds with Dr. Gay     Operation / Date: 9/3/21 -- L VATs, RA, LLL wedge resection    Surgeon: Dr. Mich Gay     Referring Physician: Dr. Gina Ordaz    SUBJECTIVE ASSESSMENT  Pt is feeling well and looking forward to going home. No complaints. Ambulates without difficutly. Moving bowels regularly. Urinating appropriately. Denies any CP, palpitations, SOB, wheezing, abd pain, n/v/d/c ,fevers or chills.     HOSPITAL COURSE:  66 year old male, PMHx of DM, HLD cardiac stents 2012 and 2018 on Effient, renal cell carcinoma s/p left nephrectomy in 2000, presented to cardiology for routine CTA Coronary, found to have multiple lung nodules. He was referred by Dr. Gina Ordaz for surgical evaluation by Dr. Gay and on 9/3/21 underwent an uncomplicated LVATS LLL wedge resection with a left chest tube left in place to suction. Patient recovered in PACU POD0. CT placed to [facility] overnight into POD1. On POD1 CT on [facility] with stable CXR, no air leak, per Dr. Gay, CT was removed and f/u CXR stable. Patient is medically ready to be discharged home per Dr. Gay and understands he needs to call for his follow up appointments.     Over 35 minutes was spent with the patient reviewing the discharge material including medications, follow up appointments, recovery, concerning symptoms, and how to contact their health care providers if they have questions    Vital Signs Last 24 Hrs  T(C): 36.9 (04 Sep 2021 09:37), Max: 36.9 (04 Sep 2021 06:33)  T(F): 98.5 (04 Sep 2021 09:37), Max: 98.5 (04 Sep 2021 09:37)  HR: 70 (04 Sep 2021 11:25) (60 - 72)  BP: 140/70 (04 Sep 2021 11:25) (129/65 - 177/76)  BP(mean): 90 (03 Sep 2021 15:00) (90 - 102)  RR: 16 (04 Sep 2021 11:25) (14 - 19)  SpO2: 99% (04 Sep 2021 11:25) (95% - 100%)    EPICARDIAL WIRES REMOVED: n/a  TIE DOWNS REMOVED: No - 1 tie down in place to be removed at follow up appointment     PHYSICAL EXAM:  General: well appearing sitting in chair in NAD   Neurological: AOx3. Motor skills grossly intact  Cardiovascular: Normal S1/S2. Regular rate/rhythm. No murmurs  Respiratory: Lungs CTA bilaterally. No wheezing or rales  Gastrointestinal: +BS in all 4 quadrants. Non-distended. Soft. Non-tender  Extremities: No swelling, no calf tenderness   Vascular: Radial 2+bilaterally, DP 2+ b/l  Incision Sites: Left VATS incisions without erythema, purulence or ecchymosis.     LABS:                        11.5   7.96  )-----------( 218      ( 04 Sep 2021 08:45 )             34.9     COUMADIN:  no     09-04    138  |  102  |  21  ----------------------------<  153<H>  4.4   |  25  |  1.34<H>    Ca    9.6      04 Sep 2021 08:45  Mg     1.6     09-04    TPro  7.4  /  Alb  3.7  /  TBili  0.6  /  DBili  x   /  AST  19  /  ALT  17  /  AlkPhos  80  09-04    Discharge CXR:  < from: Xray Chest 1 View- PORTABLE-Routine (Xray Chest 1 View- PORTABLE-Routine in AM.) (09.04.21 @ 05:30) >  IMPRESSION: Left chest tube. Nodular density left lung base.  < end of copied text >

## 2021-09-04 NOTE — DISCHARGE NOTE PROVIDER - CARE PROVIDERS DIRECT ADDRESSES
,[email or web address]

## 2021-09-04 NOTE — DISCHARGE NOTE PROVIDER - HOSPITAL COURSE
66 year old male, PMHx of DM, HLD cardiac stents 2012 and 2018 on Effient, renal cell carcinoma s/p left nephrectomy in 2000, presented to cardiology for routine CTA Coronary, found to have multiple lung nodules. He was referred by Dr. Gina Ordaz for surgical evaluation by Dr. Gay and on 9/3/21 underwent an uncomplicated LVATS LLL wedge resection with a left chest tube left in place to suction. Patient recovered in PACU POD0. CT placed to [facility] overnight into POD1. On POD1 CT on [facility] with stable CXR, no air leak, per Dr. Gay, CT was removed and f/u CXR stable. Patient is medically ready to be discharged home per Dr. Gay and understands he needs to call for his follow up appointments.     Over 35 minutes was spent with the patient reviewing the discharge material including medications, follow up appointments, recovery, concerning symptoms, and how to contact their health care providers if they have questions 66 year old male, PMHx of DM, HLD cardiac stents 2012 and 2018 on Effient, renal cell carcinoma s/p left nephrectomy in 2000, presented to cardiology for routine CTA Coronary, found to have multiple lung nodules. He was referred by Dr. Gina Ordaz for surgical evaluation by Dr. Gay and on 9/3/21 underwent an uncomplicated LVATS LLL wedge resection with a left chest tube left in place to suction. Patient recovered in PACU POD0. CT placed to [facility] overnight into POD1. On POD1 CT on [facility] with stable CXR, no air leak, per Dr. Gay, CT was removed and f/u CXR stable. Patient is medically ready to be discharged home per Dr. Gay and understands he needs to call for his follow up appointments.     Over 35 minutes was spent with the patient reviewing the discharge material including medications, follow up appointments, recovery, concerning symptoms, and how to contact their health care providers if they have questions

## 2021-09-04 NOTE — PROGRESS NOTE ADULT - ASSESSMENT
66 year old male, PMHx of DM, HLD cardiac stents 2012 and 2018 on Effient, renal cell carcinoma s/p left nephrectomy in 2000, presented to cardiology for routine CTA Coronary, found to have multiple lung nodules. He was referred by Dr. Gina Ordaz for surgical evaluation by Dr. Gay and on 9/3/21 underwent an uncomplicated LVATS LLL wedge resection with a left chest tube left in place to suction. Patient recovered in PACU POD0. Plan is to water seal chest tube tonight and remove in the morning.    ==== Neurovascular ====  -No delirium, pain well managed on current regimen  -C/w PRNs for Pain control: IV tylenol, lidocaine  -Monitor neuro status    ==== Respiratory ====  -AM CXR stable, repeat in AM  -Encourage IS 10x/hour while awake, Cough and deep breathing exercises  -Monitor respiratory status via SpO2  -Continue to wean NC as tolerated  -mutliple lung nodules incidentally found on routine CTA  -POD0 s/p LLL VATS, robo wedge resection  -1CT - no air leak - will water seal at midngiht and remove tomorrow    ==== Cardiovascular ====  -Monitor HR/BP/Tele  -PMHx of HLD, CAD s/p stents in 2012/2018 on Effient  -holding effient in setting of chest tube, will restart after CT is removed in am    ==== GI ====   -NPO until awake from anesthesia; then will advance diet as tolerated  -Prophylaxis: Protonix   -C/w bowel regimen    ==== /Renal ====  -BUN/Cr: 28/1.27  -Trend Cr on AM labs  -Replete electrolytes as needed  -PMHx Renal Cell Carcinoma s/p left nephrectomy 2000  -pulm nodules c/f metastatic RCC     ==== ID ====   Afebrile, asymptomatic  -WBC: 5.81  -Continue to monitor for SIRS/Sepsis syndrome while inpatient    ==== Endocrine ====   -DM  -c/w ISS    ==== Hematologic ====   -H/H: 10.1/30.5  -CBC, chem in AM  -DVT ppx: HSQ 5000u q8h and SCDs  -pt on effient and ASA - being held until CT removed and can restart    ==== Disposition Planning ====  Home when medically appropriate: tomorrow  
D/C Instructions:     Mich Gay)  Surgery; Thoracic Surgery  [phone] [place] [address], [place] [address]  Phone: [phone]  Fax: [phone]  Follow Up Time:     law Gina  [address] [place] [address] [address]  [place], [place]  Phone: [phone]  Fax: (   )    -  Follow Up Time:    -Please attend your discharge appointments and call the office on Tuesday in the morning on 9/7/21 to get your appointment time. Please call sooner if you have any concerns.    -Regular Diet - No restrictions    -No heavy lifting/straining, Showering allowed, Stairs allowed, Walking - Indoors allowed, Walking - Outdoors allowed  -You have a stitch with a dressing in place on your left chest. Please removed this dressing after 2 days.     -Walk daily as tolerated and use your incentive spirometer 10 times every hour while you are awake.     -Please weigh yourself daily. If you notice over a 3 pound weight gain in 3 days, this is a sign you are likely retaining too much fluid. It is imperative you call our right away with unexplained rapid weight gain.      -Please continue to wear the compression stockings given to you in the hospital at home. This is a way to prevent fluid from building up in your legs.     -No driving or strenuous activity/exercise until cleared by your surgeon.    -Gently clean your incisions with unscented/antibacterial soap and water, pat dry.  You may leave them open to air.    -Call your doctor if you have shortness of breath, chest pain not relieved by pain medication, dizziness, fever >101.5, or increased redness or drainage from incisions.

## 2021-09-04 NOTE — DISCHARGE NOTE PROVIDER - NSDCFUADDAPPT_GEN_ALL_CORE_FT
-Please attend your discharge appointments and call the office on Tuesday in the morning on 9/7/21 to get your appointment time. Please call sooner if you have any concerns.

## 2021-09-04 NOTE — DISCHARGE NOTE PROVIDER - NSDCMRMEDTOKEN_GEN_ALL_CORE_FT
aspirin 81 mg oral tablet:   atorvastatin 40 mg oral tablet: 1 tab(s) orally once a day  glipiZIDE 10 mg oral tablet: orally 2 times a day  Januvia 100 mg oral tablet: 1 tab(s) orally once a day  metFORMIN 1000 mg oral tablet: 1 tab(s) orally 2 times a day  tamsulosin 0.4 mg oral capsule: 1 cap(s) orally once a day

## 2021-09-04 NOTE — DISCHARGE NOTE NURSING/CASE MANAGEMENT/SOCIAL WORK - NSDCPEFALRISK_GEN_ALL_CORE
For information on Fall & injury Prevention, visit [email or web address]

## 2021-09-04 NOTE — DISCHARGE NOTE PROVIDER - PROVIDER TOKENS
PROVIDER:[TOKEN:[[id number]]] PROVIDER:[TOKEN:[[id number]]],FREE:[LAST:[Singwu],FIRST:[law],PHONE:[phone]],FAX:[(   )    -],ADDRESS:[[address] [place] [address] [address]  [place], [place]]]

## 2021-09-04 NOTE — DISCHARGE NOTE NURSING/CASE MANAGEMENT/SOCIAL WORK - PATIENT PORTAL LINK FT
You can access the FollowMyHealth Patient Portal offered by [facility] by registering at the following website: [email or web address]. By joining [facility]’s FollowMyHealth portal, you will also be able to view your health information using other applications (apps) compatible with our system.

## 2021-09-04 NOTE — DISCHARGE NOTE PROVIDER - CARE PROVIDER_API CALL
Mich Gay (MD)  Surgery; Thoracic Surgery  [address] [place] [address]  Phone: [phone]  Fax: [phone]  Follow Up Time:    Mich Gay (MD)  Surgery; Thoracic Surgery  [address] [place] [address] [place] [address]  Phone: [phone]  Fax: [phone]  Follow Up Time:     law Gina  [address] [place], [place] [address]  [place], [place]  Phone: [phone]  Fax: (   )    -  Follow Up Time:

## 2021-09-04 NOTE — CHART NOTE - NSCHARTNOTEFT_GEN_A_CORE
CT Removal:    Pt seen and examined at bedside.  Case discussed with Dr. Gay and is recommending removal of CT.  Minimal output from CT.  No air leak appreciated.  CT removed without incident.  Occlusive dressing placed. Follow up CXR with no obvious PTX noted.  Pt tolerated procedure well and remained hemodynamically stable.

## 2021-09-08 DIAGNOSIS — E11.9 TYPE 2 DIABETES MELLITUS WITHOUT COMPLICATIONS: ICD-10-CM

## 2021-09-08 DIAGNOSIS — R91.8 OTHER NONSPECIFIC ABNORMAL FINDING OF LUNG FIELD: ICD-10-CM

## 2021-09-08 DIAGNOSIS — Z79.899 OTHER LONG TERM (CURRENT) DRUG THERAPY: ICD-10-CM

## 2021-09-08 DIAGNOSIS — Z79.84 LONG TERM (CURRENT) USE OF ORAL HYPOGLYCEMIC DRUGS: ICD-10-CM

## 2021-09-08 DIAGNOSIS — E78.5 HYPERLIPIDEMIA, UNSPECIFIED: ICD-10-CM

## 2021-09-08 DIAGNOSIS — Z95.5 PRESENCE OF CORONARY ANGIOPLASTY IMPLANT AND GRAFT: ICD-10-CM

## 2021-09-08 DIAGNOSIS — Z90.5 ACQUIRED ABSENCE OF KIDNEY: ICD-10-CM

## 2021-09-08 DIAGNOSIS — Z85.528 PERSONAL HISTORY OF OTHER MALIGNANT NEOPLASM OF KIDNEY: ICD-10-CM

## 2021-09-08 PROBLEM — R91.1 SOLITARY PULMONARY NODULE: Chronic | Status: ACTIVE | Noted: 2021-09-02

## 2021-09-14 LAB — SURGICAL PATHOLOGY STUDY: SIGNIFICANT CHANGE UP

## 2021-09-15 PROBLEM — C78.00 METASTATIC RENAL CELL CARCINOMA TO LUNG: Status: ACTIVE | Noted: 2021-09-15

## 2021-09-15 PROBLEM — Z01.818 PRE-OP TESTING: Status: RESOLVED | Noted: 2021-08-31 | Resolved: 2021-09-15

## 2021-09-15 PROBLEM — Z48.89 POSTOPERATIVE VISIT: Status: ACTIVE | Noted: 2021-09-15

## 2021-09-15 PROBLEM — Z98.890 STATUS POST LUNG SURGERY: Status: ACTIVE | Noted: 2021-09-15

## 2021-09-17 ENCOUNTER — APPOINTMENT (OUTPATIENT)
Dept: THORACIC SURGERY | Facility: CLINIC | Age: 66
End: 2021-09-17
Payer: MEDICARE

## 2021-09-17 ENCOUNTER — OUTPATIENT (OUTPATIENT)
Dept: OUTPATIENT SERVICES | Facility: HOSPITAL | Age: 66
LOS: 1 days | End: 2021-09-17
Payer: COMMERCIAL

## 2021-09-17 VITALS
HEIGHT: 67 IN | TEMPERATURE: 97.4 F | DIASTOLIC BLOOD PRESSURE: 73 MMHG | RESPIRATION RATE: 17 BRPM | BODY MASS INDEX: 23.7 KG/M2 | SYSTOLIC BLOOD PRESSURE: 143 MMHG | WEIGHT: 151 LBS | HEART RATE: 79 BPM | OXYGEN SATURATION: 98 %

## 2021-09-17 DIAGNOSIS — Z48.89 ENCOUNTER FOR OTHER SPECIFIED SURGICAL AFTERCARE: ICD-10-CM

## 2021-09-17 DIAGNOSIS — C78.00 SECONDARY MALIGNANT NEOPLASM OF UNSPECIFIED LUNG: ICD-10-CM

## 2021-09-17 DIAGNOSIS — Z98.890 OTHER SPECIFIED POSTPROCEDURAL STATES: Chronic | ICD-10-CM

## 2021-09-17 DIAGNOSIS — Z98.890 OTHER SPECIFIED POSTPROCEDURAL STATES: ICD-10-CM

## 2021-09-17 DIAGNOSIS — C64.9 SECONDARY MALIGNANT NEOPLASM OF UNSPECIFIED LUNG: ICD-10-CM

## 2021-09-17 DIAGNOSIS — Z01.818 ENCOUNTER FOR OTHER PREPROCEDURAL EXAMINATION: ICD-10-CM

## 2021-09-17 PROCEDURE — 71046 X-RAY EXAM CHEST 2 VIEWS: CPT

## 2021-09-17 PROCEDURE — 99024 POSTOP FOLLOW-UP VISIT: CPT

## 2021-09-17 PROCEDURE — 71046 X-RAY EXAM CHEST 2 VIEWS: CPT | Mod: 26

## 2021-10-04 PROCEDURE — 83036 HEMOGLOBIN GLYCOSYLATED A1C: CPT

## 2021-10-04 PROCEDURE — S2900: CPT

## 2021-10-04 PROCEDURE — 85025 COMPLETE CBC W/AUTO DIFF WBC: CPT

## 2021-10-04 PROCEDURE — 88341 IMHCHEM/IMCYTCHM EA ADD ANTB: CPT

## 2021-10-04 PROCEDURE — 36415 COLL VENOUS BLD VENIPUNCTURE: CPT

## 2021-10-04 PROCEDURE — 86901 BLOOD TYPING SEROLOGIC RH(D): CPT

## 2021-10-04 PROCEDURE — C9399: CPT

## 2021-10-04 PROCEDURE — 80053 COMPREHEN METABOLIC PANEL: CPT

## 2021-10-04 PROCEDURE — 83735 ASSAY OF MAGNESIUM: CPT

## 2021-10-04 PROCEDURE — 85027 COMPLETE CBC AUTOMATED: CPT

## 2021-10-04 PROCEDURE — 82962 GLUCOSE BLOOD TEST: CPT

## 2021-10-04 PROCEDURE — 88307 TISSUE EXAM BY PATHOLOGIST: CPT

## 2021-10-04 PROCEDURE — 80048 BASIC METABOLIC PNL TOTAL CA: CPT

## 2021-10-04 PROCEDURE — 86900 BLOOD TYPING SEROLOGIC ABO: CPT

## 2021-10-04 PROCEDURE — 86850 RBC ANTIBODY SCREEN: CPT

## 2021-10-04 PROCEDURE — 71045 X-RAY EXAM CHEST 1 VIEW: CPT

## 2021-10-04 PROCEDURE — C1889: CPT

## 2021-10-04 PROCEDURE — 86803 HEPATITIS C AB TEST: CPT

## 2022-11-23 NOTE — PRE-OP CHECKLIST - INTERNAL PROSTHESES
Impression: Tear film insufficiency of bilateral lacrimal glands: H04.123. Clinical evaluation shows mild DED signs. Patient reports no or occasional symptoms not interfering with daily activities. Plan: PLAN: Recommend Lipid based tears to be used 4X daily. Rec 2000 mg Triglyceride based Omega 3 to be taken daily. Observe condition and RTC if symptom's worsen. coronary stents x4/yes(specify)